# Patient Record
Sex: FEMALE | Race: OTHER | NOT HISPANIC OR LATINO
[De-identification: names, ages, dates, MRNs, and addresses within clinical notes are randomized per-mention and may not be internally consistent; named-entity substitution may affect disease eponyms.]

---

## 2019-11-07 ENCOUNTER — LABORATORY RESULT (OUTPATIENT)
Age: 29
End: 2019-11-07

## 2019-11-07 ENCOUNTER — APPOINTMENT (OUTPATIENT)
Dept: INTERNAL MEDICINE | Facility: CLINIC | Age: 29
End: 2019-11-07

## 2019-11-07 ENCOUNTER — OUTPATIENT (OUTPATIENT)
Dept: OUTPATIENT SERVICES | Facility: HOSPITAL | Age: 29
LOS: 1 days | End: 2019-11-07
Payer: SELF-PAY

## 2019-11-07 VITALS
SYSTOLIC BLOOD PRESSURE: 112 MMHG | HEIGHT: 65 IN | HEART RATE: 84 BPM | BODY MASS INDEX: 26.66 KG/M2 | DIASTOLIC BLOOD PRESSURE: 78 MMHG | WEIGHT: 160 LBS | OXYGEN SATURATION: 99 %

## 2019-11-07 DIAGNOSIS — R51 HEADACHE: ICD-10-CM

## 2019-11-07 DIAGNOSIS — Z00.00 ENCOUNTER FOR GENERAL ADULT MEDICAL EXAMINATION W/OUT ABNORMAL FINDINGS: ICD-10-CM

## 2019-11-07 PROCEDURE — 86762 RUBELLA ANTIBODY: CPT

## 2019-11-07 PROCEDURE — 86780 TREPONEMA PALLIDUM: CPT

## 2019-11-07 PROCEDURE — 80053 COMPREHEN METABOLIC PANEL: CPT

## 2019-11-07 PROCEDURE — 86735 MUMPS ANTIBODY: CPT

## 2019-11-07 PROCEDURE — 82306 VITAMIN D 25 HYDROXY: CPT

## 2019-11-07 PROCEDURE — 83036 HEMOGLOBIN GLYCOSYLATED A1C: CPT

## 2019-11-07 PROCEDURE — 85027 COMPLETE CBC AUTOMATED: CPT

## 2019-11-07 PROCEDURE — 87389 HIV-1 AG W/HIV-1&-2 AB AG IA: CPT

## 2019-11-07 PROCEDURE — 80061 LIPID PANEL: CPT

## 2019-11-07 PROCEDURE — 87591 N.GONORRHOEAE DNA AMP PROB: CPT

## 2019-11-07 PROCEDURE — 87491 CHLMYD TRACH DNA AMP PROBE: CPT

## 2019-11-07 PROCEDURE — 84443 ASSAY THYROID STIM HORMONE: CPT

## 2019-11-07 PROCEDURE — 86765 RUBEOLA ANTIBODY: CPT

## 2019-11-07 PROCEDURE — G0463: CPT

## 2019-11-07 NOTE — REVIEW OF SYSTEMS
[Pain] : pain [Nausea] : nausea [Palpitations] : palpitations [Constipation] : constipation [Vomiting] : vomiting [Muscle Pain] : muscle pain [Headache] : headache [Fever] : no fever [Recent Change In Weight] : ~T no recent weight change [Vision Problems] : no vision problems [Hearing Loss] : no hearing loss [Shortness Of Breath] : no shortness of breath [Chest Pain] : no chest pain [Dysuria] : no dysuria [Poor Libido] : libido not poor [Depression] : no depression [Vaginal Discharge] : no vaginal discharge [FreeTextEntry5] : gets palpitations that are resolved by deep breathing and resting. States she gets tachycardia when she runs or laughs too hard.

## 2019-11-07 NOTE — PLAN
[FreeTextEntry1] : #Headaches\par - symptoms concerning for mixed migraine tension headaches\par - no red flags concerning for intracranial mass\par - Discuss abortive plan with OTC medications: ibuprofen, Excedrin, and caffeine \par \par #HCM \par - last pap 2 years ago, due in 2020\par - will check STDs today-  HIV, syphilis, Hep B,  G/C urine\par - Will check titers for mumps, rubella, rubeola\par - CBC, CMP, HbA1c, lipid profile, TSH, vitamin D today. \par - Flu shot \par - TdAP\par - seatbelt, smoke detector, safe sex practices discussed

## 2019-11-07 NOTE — REVIEW OF SYSTEMS
[Pain] : pain [Palpitations] : palpitations [Nausea] : nausea [Constipation] : constipation [Vomiting] : vomiting [Muscle Pain] : muscle pain [Headache] : headache [Fever] : no fever [Vision Problems] : no vision problems [Recent Change In Weight] : ~T no recent weight change [Hearing Loss] : no hearing loss [Shortness Of Breath] : no shortness of breath [Chest Pain] : no chest pain [Dysuria] : no dysuria [Poor Libido] : libido not poor [Depression] : no depression [Vaginal Discharge] : no vaginal discharge [FreeTextEntry5] : gets palpitations that are resolved by deep breathing and resting. States she gets tachycardia when she runs or laughs too hard.

## 2019-11-07 NOTE — PHYSICAL EXAM
[No Acute Distress] : no acute distress [Normal Sclera/Conjunctiva] : normal sclera/conjunctiva [PERRL] : pupils equal round and reactive to light [Clear to Auscultation] : lungs were clear to auscultation bilaterally [No Respiratory Distress] : no respiratory distress  [Normal Rate] : normal rate  [Regular Rhythm] : with a regular rhythm [Soft] : abdomen soft [Non Tender] : non-tender [Normal] : normal gait, coordination grossly intact, no focal deficits and deep tendon reflexes were 2+ and symmetric [Supple] : supple [No Spinal Tenderness] : no spinal tenderness [de-identified] : nontender bilateral paraspinal muscles, nontender spine.  [de-identified] : CN 2-12 intact, strength and sensation 5/5 in all extremities . [de-identified] : long, horizontal scar on lower abdomen from plastic surgery procedure

## 2019-11-07 NOTE — PHYSICAL EXAM
[No Acute Distress] : no acute distress [Normal Sclera/Conjunctiva] : normal sclera/conjunctiva [PERRL] : pupils equal round and reactive to light [No Respiratory Distress] : no respiratory distress  [Clear to Auscultation] : lungs were clear to auscultation bilaterally [Normal Rate] : normal rate  [Regular Rhythm] : with a regular rhythm [Soft] : abdomen soft [Non Tender] : non-tender [Normal] : normal gait, coordination grossly intact, no focal deficits and deep tendon reflexes were 2+ and symmetric [Supple] : supple [No Spinal Tenderness] : no spinal tenderness [de-identified] : nontender bilateral paraspinal muscles, nontender spine.  [de-identified] : CN 2-12 intact, strength and sensation 5/5 in all extremities . [de-identified] : long, horizontal scar on lower abdomen from plastic surgery procedure

## 2019-11-07 NOTE — PAST MEDICAL HISTORY
[Menstruating] : menstruating [Definite ___ (Date)] : the last menstrual period was [unfilled] [Regular Cycle Intervals] : have been regular [Total Preg ___] : G[unfilled] [Full Term ___] : Full Term: [unfilled] [Live Births ___] : P[unfilled]  [de-identified] : had a ?tubal ligation at age 20, unknown exactly but had an elective sterilization procedure

## 2019-11-07 NOTE — HISTORY OF PRESENT ILLNESS
[de-identified] : 29 y/o woman, new patient to this clinic, presenting for her initial CPE. \par She reports a history of headaches, but no other medical history. \par \par She reports her first headache was 9 or 10 years ago, she gets headaches from time to time. Her current headache pattern has been present since she had her last baby 8 years ago. Currently she states her headache pattern is that she will have a headache that lasts all week, it will resolved for 2 or 3 weeks, and then will come back for another week. She tries not to take any medication for pain because she does not like to take medications. When the headache is very strong, she will take tylenol and it will help. The pain is worse on the crown of her head. No photophobia. She does have nausea and vomiting with her headaches, last time she vomited was 15 days ago. The pain can reach up to 9/10 in severity, but is usually her headaches are 4/10 constant pain. Her last headache was last week.  \par \par Her headaches are usually worse at night, not in the morning. She does not get worsening pain when she bends down. She sometimes sees "some small ,far away and blurry lights" in her eyes, but this is not necessarily associated with a headache. She reports that when she laughs a lot, she gets a headache. Other things that worsen her headaches include heat, stress, working on the computer, reading. She denies photophobia, but she states that when she gets a headache, she will turn off the TV and phones and get into a dark room to make the headache go away faster. She will rest for 15 minutes and then has to get back to work as a . No rhinorrhea, fevers. She sometimes will get bilateral eye pain with her headaches. When the pain is very strong, she may also get neck pain and the headache is throbbing "on the veins of both sides of the head." \par \par She drinks coffee twice a day and has not recently cut down. She notices that 2 days before her period, she will have very intense, strong headaches. She drinks about 1.5 L water per day and does notice that her headaches can get worse when she doesn't drink enough water.\par  \par She denies numbness or weakness, change to her vision.  [FreeTextEntry1] : CPE

## 2019-11-07 NOTE — HISTORY OF PRESENT ILLNESS
[FreeTextEntry1] : CPE [de-identified] : 27 y/o woman, new patient to this clinic, presenting for her initial CPE. \par She reports a history of headaches, but no other medical history. \par \par She reports her first headache was 9 or 10 years ago, she gets headaches from time to time. Her current headache pattern has been present since she had her last baby 8 years ago. Currently she states her headache pattern is that she will have a headache that lasts all week, it will resolved for 2 or 3 weeks, and then will come back for another week. She tries not to take any medication for pain because she does not like to take medications. When the headache is very strong, she will take tylenol and it will help. The pain is worse on the crown of her head. No photophobia. She does have nausea and vomiting with her headaches, last time she vomited was 15 days ago. The pain can reach up to 9/10 in severity, but is usually her headaches are 4/10 constant pain. Her last headache was last week.  \par \par Her headaches are usually worse at night, not in the morning. She does not get worsening pain when she bends down. She sometimes sees "some small ,far away and blurry lights" in her eyes, but this is not necessarily associated with a headache. She reports that when she laughs a lot, she gets a headache. Other things that worsen her headaches include heat, stress, working on the computer, reading. She denies photophobia, but she states that when she gets a headache, she will turn off the TV and phones and get into a dark room to make the headache go away faster. She will rest for 15 minutes and then has to get back to work as a . No rhinorrhea, fevers. She sometimes will get bilateral eye pain with her headaches. When the pain is very strong, she may also get neck pain and the headache is throbbing "on the veins of both sides of the head." \par \par She drinks coffee twice a day and has not recently cut down. She notices that 2 days before her period, she will have very intense, strong headaches. She drinks about 1.5 L water per day and does notice that her headaches can get worse when she doesn't drink enough water.\par  \par She denies numbness or weakness, change to her vision.

## 2019-11-07 NOTE — HEALTH RISK ASSESSMENT
[] : Yes [Yes] : Yes [Monthly or less (1 pt)] : Monthly or less (1 point) [5 or 6 (2 pts)] : 5 or 6 (2  points) [Less than monthly (1 pt)] : Less than monthly (1 point) [No] : In the past 12 months have you used drugs other than those required for medical reasons? No [Less Than High School] : less than high school [Employed] : employed [Feels Safe at Home] : Feels safe at home [Sexually Active] : sexually active [Fully functional (bathing, dressing, toileting, transferring, walking, feeding)] : Fully functional (bathing, dressing, toileting, transferring, walking, feeding) [0] : 2) Feeling down, depressed, or hopeless: Not at all (0) [Patient reported PAP Smear was normal] : Patient reported PAP Smear was normal [Smoke Detector] : smoke detector [Carbon Monoxide Detector] : carbon monoxide detector [Seat Belt] :  uses seat belt [de-identified] : used to be a social smoker, 1 cigarette a day, then quit 1 year ago.  [de-identified] : Has not drank alcohol in a year, before that would drink a few times a year. She would drink 15 whiskeys at a party. She reports she stopped drinking because when she does drink, she tends to drink too much.  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 She has two children who live in [facility].  [FreeTextEntry2] : yadira [de-identified] : No longer using any birth control because she reports she had yellow bloody discharge after using a condom. She had a tubal ligation at 20 years old.  [de-identified] : believes there are smoke and carbon monoxide detectors where she lives, is not sure.

## 2019-11-07 NOTE — PAST MEDICAL HISTORY
[Menstruating] : menstruating [Definite ___ (Date)] : the last menstrual period was [unfilled] [Regular Cycle Intervals] : have been regular [Total Preg ___] : G[unfilled] [Full Term ___] : Full Term: [unfilled] [Live Births ___] : P[unfilled]  [de-identified] : had a ?tubal ligation at age 20, unknown exactly but had an elective sterilization procedure

## 2019-11-07 NOTE — HEALTH RISK ASSESSMENT
[] : Yes [Yes] : Yes [Monthly or less (1 pt)] : Monthly or less (1 point) [5 or 6 (2 pts)] : 5 or 6 (2  points) [Less than monthly (1 pt)] : Less than monthly (1 point) [No] : In the past 12 months have you used drugs other than those required for medical reasons? No [Less Than High School] : less than high school [Employed] : employed [Feels Safe at Home] : Feels safe at home [Sexually Active] : sexually active [Fully functional (bathing, dressing, toileting, transferring, walking, feeding)] : Fully functional (bathing, dressing, toileting, transferring, walking, feeding) [0] : 2) Feeling down, depressed, or hopeless: Not at all (0) [Patient reported PAP Smear was normal] : Patient reported PAP Smear was normal [Smoke Detector] : smoke detector [Carbon Monoxide Detector] : carbon monoxide detector [Seat Belt] :  uses seat belt [de-identified] : used to be a social smoker, 1 cigarette a day, then quit 1 year ago.  [de-identified] : Has not drank alcohol in a year, before that would drink a few times a year. She would drink 15 whiskeys at a party. She reports she stopped drinking because when she does drink, she tends to drink too much.  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 She has two children who live in [facility].  [de-identified] : No longer using any birth control because she reports she had yellow bloody discharge after using a condom. She had a tubal ligation at 20 years old.  [de-identified] : believes there are smoke and carbon monoxide detectors where she lives, is not sure.

## 2019-11-08 DIAGNOSIS — I10 ESSENTIAL (PRIMARY) HYPERTENSION: ICD-10-CM

## 2019-11-08 LAB
24R-OH-CALCIDIOL SERPL-MCNC: 25.6 NG/ML — LOW (ref 30–80)
ALBUMIN SERPL ELPH-MCNC: 4.7 G/DL — SIGNIFICANT CHANGE UP (ref 3.3–5)
ALP SERPL-CCNC: 94 U/L — SIGNIFICANT CHANGE UP (ref 40–120)
ALT FLD-CCNC: 16 U/L — SIGNIFICANT CHANGE UP (ref 10–45)
ANION GAP SERPL CALC-SCNC: 16 MMOL/L — SIGNIFICANT CHANGE UP (ref 5–17)
AST SERPL-CCNC: 15 U/L — SIGNIFICANT CHANGE UP (ref 10–40)
BILIRUB SERPL-MCNC: 0.2 MG/DL — SIGNIFICANT CHANGE UP (ref 0.2–1.2)
BUN SERPL-MCNC: 14 MG/DL — SIGNIFICANT CHANGE UP (ref 7–23)
C TRACH RRNA SPEC QL NAA+PROBE: SIGNIFICANT CHANGE UP
CALCIUM SERPL-MCNC: 9.4 MG/DL — SIGNIFICANT CHANGE UP (ref 8.4–10.5)
CHLORIDE SERPL-SCNC: 103 MMOL/L — SIGNIFICANT CHANGE UP (ref 96–108)
CHOLEST SERPL-MCNC: 132 MG/DL — SIGNIFICANT CHANGE UP (ref 10–199)
CO2 SERPL-SCNC: 20 MMOL/L — LOW (ref 22–31)
CREAT SERPL-MCNC: 0.72 MG/DL — SIGNIFICANT CHANGE UP (ref 0.5–1.3)
ESTIMATED AVERAGE GLUCOSE: 108 MG/DL — SIGNIFICANT CHANGE UP (ref 68–114)
GLUCOSE SERPL-MCNC: 90 MG/DL — SIGNIFICANT CHANGE UP (ref 70–99)
HBA1C BLD-MCNC: 5.4 % — SIGNIFICANT CHANGE UP (ref 4–5.6)
HCT VFR BLD CALC: 40.4 % — SIGNIFICANT CHANGE UP (ref 34.5–45)
HDLC SERPL-MCNC: 31 MG/DL — LOW
HGB BLD-MCNC: 13.3 G/DL — SIGNIFICANT CHANGE UP (ref 11.5–15.5)
HIV 1+2 AB+HIV1 P24 AG SERPL QL IA: SIGNIFICANT CHANGE UP
LIPID PNL WITH DIRECT LDL SERPL: 66 MG/DL — SIGNIFICANT CHANGE UP
MCHC RBC-ENTMCNC: 28.5 PG — SIGNIFICANT CHANGE UP (ref 27–34)
MCHC RBC-ENTMCNC: 32.9 GM/DL — SIGNIFICANT CHANGE UP (ref 32–36)
MCV RBC AUTO: 86.5 FL — SIGNIFICANT CHANGE UP (ref 80–100)
MEV IGG SER-ACNC: <5 AU/ML — SIGNIFICANT CHANGE UP
MEV IGG+IGM SER-IMP: NEGATIVE — SIGNIFICANT CHANGE UP
MUV AB SER-ACNC: POSITIVE — SIGNIFICANT CHANGE UP
MUV IGG FLD-ACNC: 56.1 AU/ML — SIGNIFICANT CHANGE UP
N GONORRHOEA RRNA SPEC QL NAA+PROBE: SIGNIFICANT CHANGE UP
PLATELET # BLD AUTO: 341 K/UL — SIGNIFICANT CHANGE UP (ref 150–400)
POTASSIUM SERPL-MCNC: 4.2 MMOL/L — SIGNIFICANT CHANGE UP (ref 3.5–5.3)
POTASSIUM SERPL-SCNC: 4.2 MMOL/L — SIGNIFICANT CHANGE UP (ref 3.5–5.3)
PROT SERPL-MCNC: 7.3 G/DL — SIGNIFICANT CHANGE UP (ref 6–8.3)
RBC # BLD: 4.67 M/UL — SIGNIFICANT CHANGE UP (ref 3.8–5.2)
RBC # FLD: 13.6 % — SIGNIFICANT CHANGE UP (ref 10.3–14.5)
RUBV IGG SER-ACNC: 1.6 INDEX — SIGNIFICANT CHANGE UP
RUBV IGG SER-IMP: POSITIVE — SIGNIFICANT CHANGE UP
SODIUM SERPL-SCNC: 139 MMOL/L — SIGNIFICANT CHANGE UP (ref 135–145)
SPECIMEN SOURCE: SIGNIFICANT CHANGE UP
T PALLIDUM AB TITR SER: NEGATIVE — SIGNIFICANT CHANGE UP
T4 FREE+ TSH PNL SERPL: 3.05 UIU/ML — SIGNIFICANT CHANGE UP (ref 0.27–4.2)
TOTAL CHOLESTEROL/HDL RATIO MEASUREMENT: 4.3 RATIO — SIGNIFICANT CHANGE UP (ref 3.3–7.1)
TRIGL SERPL-MCNC: 175 MG/DL — HIGH (ref 10–149)
WBC # BLD: 9.67 K/UL — SIGNIFICANT CHANGE UP (ref 3.8–10.5)
WBC # FLD AUTO: 9.67 K/UL — SIGNIFICANT CHANGE UP (ref 3.8–10.5)

## 2019-11-11 DIAGNOSIS — R51 HEADACHE: ICD-10-CM

## 2019-11-22 ENCOUNTER — RESULT REVIEW (OUTPATIENT)
Age: 29
End: 2019-11-22

## 2019-12-09 ENCOUNTER — TRANSCRIPTION ENCOUNTER (OUTPATIENT)
Age: 29
End: 2019-12-09

## 2019-12-09 ENCOUNTER — APPOINTMENT (OUTPATIENT)
Dept: INTERNAL MEDICINE | Facility: CLINIC | Age: 29
End: 2019-12-09

## 2019-12-11 NOTE — HISTORY OF PRESENT ILLNESS
[FreeTextEntry1] : headaches, low back pain  [de-identified] : 28 y/o F with PMH of mixed tension and migraine headaches presents today for follow up visit. \par \par Headaches are described as lasting for one week at a time and improve for 2-3 weeks. Taking tylenol occasionally for relief.

## 2019-12-11 NOTE — PLAN
[FreeTextEntry1] : #Headaches\par - symptoms concerning for mixed migraine tension headaches\par - no red flags concerning for intracranial mass\par - Discuss abortive plan with OTC medications: ibuprofen, Excedrin, and caffeine \par \par #Vitamin D deficiency (25.6) \par - repeat Vit D level today \par \par # Hypertriglyceridemia\par  -Discussed healthy food choices and to limit saturated fats and processed foods, increase fruits and vegetals\par \par #HCM\par - last pap 2 years ago, due in 2020\par - STD and HIV testing UTD today\par - Influenza and Tdap vax UTD\par - Encouraged pt to increase physical activity

## 2020-08-26 ENCOUNTER — LABORATORY RESULT (OUTPATIENT)
Age: 30
End: 2020-08-26

## 2020-08-26 ENCOUNTER — APPOINTMENT (OUTPATIENT)
Dept: INTERNAL MEDICINE | Facility: CLINIC | Age: 30
End: 2020-08-26

## 2020-08-26 ENCOUNTER — OUTPATIENT (OUTPATIENT)
Dept: OUTPATIENT SERVICES | Facility: HOSPITAL | Age: 30
LOS: 1 days | End: 2020-08-26
Payer: SELF-PAY

## 2020-08-26 VITALS
SYSTOLIC BLOOD PRESSURE: 100 MMHG | WEIGHT: 162 LBS | OXYGEN SATURATION: 99 % | DIASTOLIC BLOOD PRESSURE: 70 MMHG | HEART RATE: 75 BPM | BODY MASS INDEX: 26.96 KG/M2

## 2020-08-26 DIAGNOSIS — R13.10 DYSPHAGIA, UNSPECIFIED: ICD-10-CM

## 2020-08-26 DIAGNOSIS — Z83.49 FAMILY HISTORY OF OTHER ENDOCRINE, NUTRITIONAL AND METABOLIC DISEASES: ICD-10-CM

## 2020-08-26 DIAGNOSIS — Z11.3 ENCOUNTER FOR SCREENING FOR INFECTIONS WITH A PREDOMINANTLY SEXUAL MODE OF TRANSMISSION: ICD-10-CM

## 2020-08-26 DIAGNOSIS — Z82.61 FAMILY HISTORY OF ARTHRITIS: ICD-10-CM

## 2020-08-26 DIAGNOSIS — I10 ESSENTIAL (PRIMARY) HYPERTENSION: ICD-10-CM

## 2020-08-26 LAB
HCT VFR BLD CALC: 41 % — SIGNIFICANT CHANGE UP (ref 34.5–45)
HGB BLD-MCNC: 12.9 G/DL — SIGNIFICANT CHANGE UP (ref 11.5–15.5)
MCHC RBC-ENTMCNC: 28.2 PG — SIGNIFICANT CHANGE UP (ref 27–34)
MCHC RBC-ENTMCNC: 31.5 GM/DL — LOW (ref 32–36)
MCV RBC AUTO: 89.5 FL — SIGNIFICANT CHANGE UP (ref 80–100)
PLATELET # BLD AUTO: 319 K/UL — SIGNIFICANT CHANGE UP (ref 150–400)
RBC # BLD: 4.58 M/UL — SIGNIFICANT CHANGE UP (ref 3.8–5.2)
RBC # FLD: 13.4 % — SIGNIFICANT CHANGE UP (ref 10.3–14.5)
WBC # BLD: 7.29 K/UL — SIGNIFICANT CHANGE UP (ref 3.8–10.5)
WBC # FLD AUTO: 7.29 K/UL — SIGNIFICANT CHANGE UP (ref 3.8–10.5)

## 2020-08-26 PROCEDURE — G0463: CPT

## 2020-08-26 PROCEDURE — 87491 CHLMYD TRACH DNA AMP PROBE: CPT

## 2020-08-26 PROCEDURE — 87389 HIV-1 AG W/HIV-1&-2 AB AG IA: CPT

## 2020-08-26 PROCEDURE — 83520 IMMUNOASSAY QUANT NOS NONAB: CPT

## 2020-08-26 PROCEDURE — 86769 SARS-COV-2 COVID-19 ANTIBODY: CPT

## 2020-08-26 PROCEDURE — 86780 TREPONEMA PALLIDUM: CPT

## 2020-08-26 PROCEDURE — 87591 N.GONORRHOEAE DNA AMP PROB: CPT

## 2020-08-26 PROCEDURE — 85027 COMPLETE CBC AUTOMATED: CPT

## 2020-08-26 PROCEDURE — 84443 ASSAY THYROID STIM HORMONE: CPT

## 2020-08-27 LAB
C TRACH RRNA SPEC QL NAA+PROBE: SIGNIFICANT CHANGE UP
HIV 1+2 AB+HIV1 P24 AG SERPL QL IA: SIGNIFICANT CHANGE UP
N GONORRHOEA RRNA SPEC QL NAA+PROBE: SIGNIFICANT CHANGE UP
SARS-COV-2 IGG SERPL QL IA: NEGATIVE — SIGNIFICANT CHANGE UP
SARS-COV-2 IGM SERPL IA-ACNC: 0.02 INDEX — SIGNIFICANT CHANGE UP
SPECIMEN SOURCE: SIGNIFICANT CHANGE UP
T PALLIDUM AB TITR SER: NEGATIVE — SIGNIFICANT CHANGE UP
T4 FREE+ TSH PNL SERPL: 2.48 UIU/ML — SIGNIFICANT CHANGE UP (ref 0.27–4.2)

## 2020-08-27 NOTE — ASSESSMENT
[FreeTextEntry1] : Ms. Dawkins is a 30 yo F with no signficant PMH presenting with a sensation of something stuck in her throat.

## 2020-08-27 NOTE — PHYSICAL EXAM
[No Lymphadenopathy] : no lymphadenopathy [Supple] : supple [Normal] : no CVA or spinal tenderness [No Rash] : no rash [de-identified] : Possible slight thyroid enlargement, no nodules palpable. When author palpates the thyroid patient endorses that this is where she feels her foreign body senation. [de-identified] : Normal light reflex on bilateral TM. There are areas of nonreflective, white tissue covering small portions of the superior aspects of the TMs bilaterally. Normal oropharynx without erythema or masses or uvula deviation. [de-identified] : Tenderness but no erythema nor swelling at PIPs

## 2020-08-27 NOTE — HISTORY OF PRESENT ILLNESS
[FreeTextEntry8] : Ms. Dawkins is a 30 yo F with no significant PMH who presents with two weeks of changes in swallowing. She feels that there is something stuck in her lower throat. She has no difficulty swallowing solids, but when she swallows liquids she feels as if something is stuck in her throat. This is associated with feeling the need to "pop" her ears by moving her jaw down to relieve a sensation in her bilateral ears. The severity is mild as these symptoms have not interfered with her daily life. She endorses some associated shortness of breath and deep breathing associated with these episodes of feeling that something is stuck in her throat. She does not report modifying factors.\par \par She also complains of pain in the proximal joints of her fingers.

## 2020-08-27 NOTE — PLAN
[FreeTextEntry1] : #Difficulty swallowing, foreign body sensation in throat\par -DDx: Globus, goiter/thyroid nodule given possible thyroid given exam, structural causes are less likely as it seems to affect liquids more than solids, but must be ruled out\par -TSH\par -Thyroid receptor antibodies\par -Thyroid ultrasound\par -ENT referral\par -Thyroid ultrasound\par \par #Screening for sexually transmitted diseases\par -F/u gonorrhea\par -F/u chlamydia\par -F/u HIV\par -F/u syphillis\par \par Brayden Bustillo, PGY2

## 2020-08-27 NOTE — REVIEW OF SYSTEMS
[Joint Stiffness] : joint stiffness [Negative] : Cardiovascular [Earache] : no earache [Hearing Loss] : no hearing loss [Nasal Discharge] : no nasal discharge [Shortness Of Breath] : no shortness of breath [Sore Throat] : no sore throat [Cough] : no cough [Wheezing] : no wheezing [Abdominal Pain] : no abdominal pain [Dyspnea on Exertion] : no dyspnea on exertion [Nausea] : no nausea [Vomiting] : no vomiting [Dysuria] : no dysuria [Hematuria] : no hematuria [Joint Pain] : no joint pain [Muscle Pain] : no muscle pain [FreeTextEntry4] : Urge to move her jaw to change pressure in inner ears as in HPI [Headache] : no headache

## 2020-09-01 LAB — TSH RECEP AB FLD-ACNC: <1.1 IU/L — SIGNIFICANT CHANGE UP (ref 0–1.75)
